# Patient Record
Sex: FEMALE | Race: WHITE | NOT HISPANIC OR LATINO | Employment: OTHER | URBAN - METROPOLITAN AREA
[De-identification: names, ages, dates, MRNs, and addresses within clinical notes are randomized per-mention and may not be internally consistent; named-entity substitution may affect disease eponyms.]

---

## 2018-07-05 ENCOUNTER — OFFICE VISIT - HEALTHEAST (OUTPATIENT)
Dept: ALLERGY | Facility: CLINIC | Age: 68
End: 2018-07-05

## 2018-07-05 DIAGNOSIS — J30.81 ALLERGIC RHINITIS DUE TO DOG HAIR: ICD-10-CM

## 2018-07-05 RX ORDER — LEVOTHYROXINE SODIUM 50 UG/1
TABLET ORAL
Refills: 2 | Status: SHIPPED | COMMUNITY
Start: 2018-06-20

## 2018-07-05 RX ORDER — LORATADINE 10 MG/1
10 TABLET ORAL DAILY
Status: SHIPPED | COMMUNITY
Start: 2018-07-05

## 2018-07-05 RX ORDER — LIOTHYRONINE SODIUM 5 UG/1
TABLET ORAL
Refills: 2 | Status: SHIPPED | COMMUNITY
Start: 2018-06-20

## 2018-07-05 RX ORDER — ALPRAZOLAM 0.25 MG
TABLET ORAL
Refills: 0 | Status: SHIPPED | COMMUNITY
Start: 2018-07-02

## 2018-07-05 RX ORDER — ZOLPIDEM TARTRATE 5 MG/1
TABLET ORAL
Refills: 0 | Status: SHIPPED | COMMUNITY
Start: 2018-07-02

## 2018-07-05 ASSESSMENT — MIFFLIN-ST. JEOR: SCORE: 1273.38

## 2021-05-28 ENCOUNTER — RECORDS - HEALTHEAST (OUTPATIENT)
Dept: ADMINISTRATIVE | Facility: CLINIC | Age: 71
End: 2021-05-28

## 2021-06-01 VITALS — HEIGHT: 63 IN | BODY MASS INDEX: 31.02 KG/M2 | WEIGHT: 175.1 LBS

## 2021-06-19 NOTE — PROGRESS NOTES
"Chief complaint: Concerns with dog allergy    History of present illness: This is a pleasant 67-year-old woman previous patient of Dr. Brewster who is here today for dog allergy evaluation.  She is traveling to her son's house on Sunday in [place].  He has a dog that is 2 years old.  She states when she is around dogs she has some pain in her throat and congestion or ear which develops into a sinus infection.  She states it happens most every time she is exposed to dog.  She reports that her son's house does not have carpeting.  They do have air purifiers.  The dog does not go in the room that she will be staying in.  She does take loratadine as needed.  She brings testing from 2002 that shows delayed testing rather than trigger intradermal testing positive for dog.  She reports that she is around cigarette smoke she also has migraines.  In the past she tried Flonase but it gave her headaches.  She states when she has sinus infection, she is treated with a Z-Delvin.  No history of asthma.  No cough, wheeze or shortness of breath.    Past medical history: Hypothyroidism    Social history: She is retired, non-smoker, no animals in her home    Family history: Negative for allergies and asthma    Review of Systems performed as above and the remainder is negative.      Current Outpatient Prescriptions:      ALPRAZolam (XANAX) 0.25 MG tablet, , Disp: , Rfl: 0     cholecalciferol, vitamin D3, (VITAMIN D3) 2,000 unit capsule, Take 4,000 Units by mouth daily., Disp: , Rfl:      levothyroxine (SYNTHROID, LEVOTHROID) 50 MCG tablet, , Disp: , Rfl: 2     liothyronine (CYTOMEL) 5 MCG tablet, , Disp: , Rfl: 2     zolpidem (AMBIEN) 5 MG tablet, , Disp: , Rfl: 0     loratadine (CLARITIN) 10 mg tablet, Take 10 mg by mouth daily., Disp: , Rfl:     Allergies   Allergen Reactions     Codeine      Levofloxacin        /68  Pulse 68  Ht 5' 3\" (1.6 m)  Wt 175 lb 1.6 oz (79.4 kg)  SpO2 97%  BMI 31.02 kg/m2  Gen: Pleasant female not " in acute distress  HEENT: Eyes no erythema of the bulbar or palpebral conjunctiva, no edema. Mouth: Throat clear, no lip or tongue edema.     Skin: No rashes or lesions  Psych: Alert and oriented times 3    Impression report and plan:  1.  Possible allergic rhinitis to dog    State that delayed testing is not valid.  She is also concerned about being allergic to cigarette smoke.  I stated that this is more of an irritant rather than allergic response.  Avoidance is key with cigarette smoke.  She does seem to have some symptoms around dog, however so would be reasonable for her to institute some environmental control.  Went over environmental control measures for dog.  Recommended nasal rinses.  Okay to double dose loratadine as needed.  Would recommend starting Nasacort nasal spray 1 spray each nostril twice daily.  In the future, I would recommend starting Nasacort nasal spray or montelukast at least one month prior to visiting her son's home if the dog does bother her.  Follow as needed.    Time spent with the patient, 30 minutes, greater than half spent counseling and coordination of care regarding allergies.

## 2021-11-16 ENCOUNTER — ANCILLARY PROCEDURE (OUTPATIENT)
Dept: GENERAL RADIOLOGY | Facility: CLINIC | Age: 71
End: 2021-11-16
Attending: PEDIATRICS
Payer: COMMERCIAL

## 2021-11-16 ENCOUNTER — OFFICE VISIT (OUTPATIENT)
Dept: ORTHOPEDICS | Facility: CLINIC | Age: 71
End: 2021-11-16
Payer: COMMERCIAL

## 2021-11-16 VITALS — DIASTOLIC BLOOD PRESSURE: 80 MMHG | HEIGHT: 63 IN | BODY MASS INDEX: 31.02 KG/M2 | SYSTOLIC BLOOD PRESSURE: 124 MMHG

## 2021-11-16 DIAGNOSIS — M79.671 RIGHT FOOT PAIN: Primary | ICD-10-CM

## 2021-11-16 DIAGNOSIS — M79.671 RIGHT FOOT PAIN: ICD-10-CM

## 2021-11-16 PROCEDURE — 99203 OFFICE O/P NEW LOW 30 MIN: CPT | Performed by: PEDIATRICS

## 2021-11-16 PROCEDURE — 73630 X-RAY EXAM OF FOOT: CPT | Mod: RT | Performed by: RADIOLOGY

## 2021-11-16 RX ORDER — SUMATRIPTAN 50 MG/1
TABLET, FILM COATED ORAL
COMMUNITY

## 2021-11-16 NOTE — Clinical Note
11/16/2021         RE: Carlotta Busch  [address] [facility] [address]        Dear Colleague,    Thank you for referring your patient, Carlotta Busch, to the [facility]. Please see a copy of my visit note below.    ASSESSMENT & PLAN    Carlotta was seen today for pain.    Diagnoses and all orders for this visit:    Right foot pain  -     XR Foot Right G/E 3 Views; Future      Occult bony injury vs bursitis vs capsulitis.  We discussed the following: symptom treatment, activity modification/rest, imaging and support for the affected area. Following discussion, plan:  Support options reviewed, more rigid support. Not certain whether she would like Dynaflex plate or fracture shoe, so she can investigate on own for now. Otherwise, supportive footwear, and activity modification reviewed.  Questions answered. Discussed signs and symptoms that may indicate more serious issues; the patient was instructed to seek appropriate care if noted. Alisiaer indicates understanding of these issues and agrees with the plan.          See Patient Instructions  Patient Instructions   X-rays do not clearly show a fracture or other bony injury in the area of concern in the forefoot.  Occult bony injury is a consideration.  We also discussed potential for some local bursitis, as well as extensor tendinitis.  Icing, over-the-counter medication as needed for soreness.  Discussed limiting activities for soreness as well.  We also discussed use of more rigid foot support for comfort with activities, including Dynaflex plate versus a fracture shoe.  They were with activities if desired, not required.  Plan to monitor over the next 1 to 2 weeks.  If not getting good improvement during that time, contact the clinic, and we would consider an MRI for further investigation.    If you have any further questions for your physician or physician s care team you can call [phone] and use option 3 to leave a  "voice message. Calls received during business hours will be returned same day.        [facility]    -----  Chief Complaint   Patient presents with     Right Foot - Pain       SUBJECTIVE  Carlotta Busch is a/an 71 year old female who is seen as a self referral for evaluation of right foot pain.  Had her foot caught in her vacuum cord about 5 months ago.  Inverted her foot.  Has had intermittent pain since that time.  Pain and swelling increased over the past 1-2 days following a walk.  Pain is midfoot.  .     The patient is seen by themselves.  The patient is Right handed    Onset: 5 month(s) ago. Patient describes injury as possible inversion of foot   Location of Pain: right mid foot 3rd and 4th metatarsals   Worsened by: walking   Better with: ice   Treatments tried: ice  Associated symptoms: swelling    Orthopedic/Surgical history: HX of foot fx, not sure what side, HX of small toe fx last year, was not seen for it.   Social History/Occupation: retired     No family history pertinent to patient's problem today.     **  Foot pain increased past couple days.  Previous right 5th toe fracture.  Pain in foot is more along 4th MT course. Also distal 3rd MT, more in distal 4th MT, to base of toes.  Had some initial swelling and bruising in right foot after injury, then improved. Now swelling has returned a bit.  No N/T.      REVIEW OF SYSTEMS:  Review of Systems      OBJECTIVE:  /80   Ht 1.6 m (5' 3\")   BMI 31.02 kg/m     General: healthy, alert and in no distress  HEENT: no scleral icterus or conjunctival erythema  Skin: no suspicious lesions or rash. No jaundice.  CV: distal perfusion intact   Resp: normal respiratory effort without conversational dyspnea   Psych: normal mood and affect  Gait: normal steady gait with appropriate coordination and balance, some pain with toeing off  Neuro: Normal light sensory exam of  extremity       Right Foot " exam    ROM:      Ankle grossly full  Toes mild limited with pain  Passive lesser toe motion also with some forefoot pain    Strength: intact ankle, toes, mild forefoot pain with assessing toes     Tender:     Dorsal distal forefoot, mid-distal 3rd and 4th MT, MTP joints   Also plantar forefoot tender same area    Inspection: hallux valgus    Skin:     neg (-) bruising        positive (+) swelling mild dorsal forefoot       well perfused       capillary refill brisk          RADIOLOGY:  I independently ordered, visualized and reviewed these images with the patient  No acute bony abnormality, particularly in lateral midfoot    Recent Results (from the past 24 hour(s))   XR Foot Right G/E 3 Views    Narrative    XR FOOT RIGHT G/E 3 VIEWS 11/16/2021 10:57 AM     HISTORY: Right foot pain      Impression    IMPRESSION: Mild bunion. Otherwise unremarkable radiographs.    YANETH ANGULO MD         SYSTEM ID:  [id number]             Again, thank you for allowing me to participate in the care of your patient.        Sincerely,        Esequiel Rees DO

## 2021-11-16 NOTE — PROGRESS NOTES
ASSESSMENT & PLAN    Carlotta was seen today for pain.    Diagnoses and all orders for this visit:    Right foot pain  -     XR Foot Right G/E 3 Views; Future      Occult bony injury vs bursitis vs capsulitis.  We discussed the following: symptom treatment, activity modification/rest, imaging and support for the affected area. Following discussion, plan:  Support options reviewed, more rigid support. Not certain whether she would like Dynaflex plate or fracture shoe, so she can investigate on own for now. Otherwise, supportive footwear, and activity modification reviewed.  Questions answered. Discussed signs and symptoms that may indicate more serious issues; the patient was instructed to seek appropriate care if noted. Ginger indicates understanding of these issues and agrees with the plan.          See Patient Instructions  Patient Instructions   X-rays do not clearly show a fracture or other bony injury in the area of concern in the forefoot.  Occult bony injury is a consideration.  We also discussed potential for some local bursitis, as well as extensor tendinitis.  Icing, over-the-counter medication as needed for soreness.  Discussed limiting activities for soreness as well.  We also discussed use of more rigid foot support for comfort with activities, including Dynaflex plate versus a fracture shoe.  They were with activities if desired, not required.  Plan to monitor over the next 1 to 2 weeks.  If not getting good improvement during that time, contact the clinic, and we would consider an MRI for further investigation.    If you have any further questions for your physician or physician s care team you can call [phone] and use option 3 to leave a voice message. Calls received during business hours will be returned same day.        Esequiel Rees DO  [facility]    -----  Chief Complaint   Patient presents with     Right Foot - Pain       SUBJECTIVE  Carlotta TA  "Jo-Ann is a/an 71 year old female who is seen as a self referral for evaluation of right foot pain.  Had her foot caught in her vacuum cord about 5 months ago.  Inverted her foot.  Has had intermittent pain since that time.  Pain and swelling increased over the past 1-2 days following a walk.  Pain is midfoot.  .     The patient is seen by themselves.  The patient is Right handed    Onset: 5 month(s) ago. Patient describes injury as possible inversion of foot   Location of Pain: right mid foot 3rd and 4th metatarsals   Worsened by: walking   Better with: ice   Treatments tried: ice  Associated symptoms: swelling    Orthopedic/Surgical history: HX of foot fx, not sure what side, HX of small toe fx last year, was not seen for it.   Social History/Occupation: retired     No family history pertinent to patient's problem today.     **  Foot pain increased past couple days.  Previous right 5th toe fracture.  Pain in foot is more along 4th MT course. Also distal 3rd MT, more in distal 4th MT, to base of toes.  Had some initial swelling and bruising in right foot after injury, then improved. Now swelling has returned a bit.  No N/T.      REVIEW OF SYSTEMS:  Review of Systems      OBJECTIVE:  /80   Ht 1.6 m (5' 3\")   BMI 31.02 kg/m     General: healthy, alert and in no distress  HEENT: no scleral icterus or conjunctival erythema  Skin: no suspicious lesions or rash. No jaundice.  CV: distal perfusion intact   Resp: normal respiratory effort without conversational dyspnea   Psych: normal mood and affect  Gait: normal steady gait with appropriate coordination and balance, some pain with toeing off  Neuro: Normal light sensory exam of  extremity       Right Foot exam    ROM:      Ankle grossly full  Toes mild limited with pain  Passive lesser toe motion also with some forefoot pain    Strength: intact ankle, toes, mild forefoot pain with assessing toes     Tender:     Dorsal distal forefoot, mid-distal 3rd and 4th MT, " MTP joints   Also plantar forefoot tender same area    Inspection: hallux valgus    Skin:     neg (-) bruising        positive (+) swelling mild dorsal forefoot       well perfused       capillary refill brisk          RADIOLOGY:  I independently ordered, visualized and reviewed these images with the patient  No acute bony abnormality, particularly in lateral midfoot    Recent Results (from the past 24 hour(s))   XR Foot Right G/E 3 Views    Narrative    XR FOOT RIGHT G/E 3 VIEWS 11/16/2021 10:57 AM     HISTORY: Right foot pain      Impression    IMPRESSION: Mild bunion. Otherwise unremarkable radiographs.    YANETH ANGULO MD         SYSTEM ID:  [id number]

## 2021-11-16 NOTE — PATIENT INSTRUCTIONS
X-rays do not clearly show a fracture or other bony injury in the area of concern in the forefoot.  Occult bony injury is a consideration.  We also discussed potential for some local bursitis, as well as extensor tendinitis.  Icing, over-the-counter medication as needed for soreness.  Discussed limiting activities for soreness as well.  We also discussed use of more rigid foot support for comfort with activities, including Dynaflex plate versus a fracture shoe.  They were with activities if desired, not required.  Plan to monitor over the next 1 to 2 weeks.  If not getting good improvement during that time, contact the clinic, and we would consider an MRI for further investigation.    If you have any further questions for your physician or physician s care team you can call [phone] and use option 3 to leave a voice message. Calls received during business hours will be returned same day.

## 2021-11-22 ENCOUNTER — TELEPHONE (OUTPATIENT)
Dept: ORTHOPEDICS | Facility: CLINIC | Age: 71
End: 2021-11-22
Payer: COMMERCIAL

## 2021-11-22 DIAGNOSIS — M79.671 RIGHT FOOT PAIN: Primary | ICD-10-CM

## 2021-11-22 NOTE — TELEPHONE ENCOUNTER
[facility] Call Center    Phone Message    May a detailed message be left on voicemail: yes     Reason for Call: Other: Prior Auth need for right foot MRI. Patient is also wondering how the MRI is done, is it through a tube? Please call patient back.     Action Taken: Message routed to:  Other: Sports Medicine    Travel Screening: Not Applicable

## 2021-11-22 NOTE — TELEPHONE ENCOUNTER
MRI has not been ordered.     Called and spoke with patient. Continues to have swelling and pain in her foot.  Pain third and forth toe into her foot.   She would like an MRI for further evaluation.     MRI order pended.  Please advise    Keisha Gore MS ATC

## 2021-11-23 ENCOUNTER — HOSPITAL ENCOUNTER (OUTPATIENT)
Dept: MRI IMAGING | Facility: HOSPITAL | Age: 71
Discharge: HOME | End: 2021-11-23
Attending: PEDIATRICS | Admitting: PEDIATRICS
Payer: COMMERCIAL

## 2021-11-23 DIAGNOSIS — M79.671 RIGHT FOOT PAIN: ICD-10-CM

## 2021-11-23 PROCEDURE — 73718 MRI LOWER EXTREMITY W/O DYE: CPT | Mod: RT

## 2021-11-23 NOTE — TELEPHONE ENCOUNTER
LVM for patient that MRI order was signed.  Left number for imaging scheduling.   Keisha Gore MS ATC

## 2021-11-24 ENCOUNTER — TELEPHONE (OUTPATIENT)
Dept: ORTHOPEDICS | Facility: CLINIC | Age: 71
End: 2021-11-24
Payer: COMMERCIAL

## 2021-11-24 DIAGNOSIS — M79.671 RIGHT FOOT PAIN: Primary | ICD-10-CM

## 2021-11-25 NOTE — TELEPHONE ENCOUNTER
Reason for Call:  Request for results:    Name of test or procedure: MRI of right foot    Date of test of procedure: 11/23/21    Results:   Results for orders placed or performed during the hospital encounter of 11/23/21   MR Foot Right w/o Contrast    Narrative    EXAM: MR FOOT RIGHT W/O CONTRAST  LOCATION: [facility]  DATE/TIME: 11/23/2021 7:02 PM    INDICATION:  Right foot pain, prior injury.  COMPARISON: 11/16/2021 radiographs.  TECHNIQUE: Unenhanced.    FINDINGS:     JOINTS AND BONES:   -Acute nondisplaced transverse fracture of the distal portion of the third metatarsal shaft with moderate surrounding marrow and soft tissue edema, as best seen series 7, image 16 and series 4, image 13. No additional fractures. No contusion. Mild   degenerative changes at the first MTP joint. No joint effusion.    TENDONS:   -No tendon tear, tendinopathy, or tenosynovitis.     LIGAMENTS:   -Lisfranc ligament: Intact. No subluxation.    MUSCLES AND SOFT TISSUES:   -No muscle atrophy or edema. No soft tissue mass or fluid collection.      Impression    IMPRESSION:  1.  Acute nondisplaced fracture of the distal portion of the third metatarsal shaft.    2.  No evidence of additional fractures.       Plan for results from last OV: call patient    OK to leave the result message on voice mail or with a family member? YES    Phone number Patient can be reached at:  Home number on file [phone] (home)    Jack Finley ATC

## 2021-11-26 NOTE — TELEPHONE ENCOUNTER
There is edema in the forefoot, and findings are consistent with a fracture in the 3rd metatarsal. Also mild degenerative change at base of great toe (first MTP joint). Remainder without concern.  This fracture is an interesting finding given the injury to her foot was rather remote (~5 months ago).  Would advise rigid support for the foot. We had discussed fracture shoe, Dynaflex plate at her visit. Another option is short cam walker.  If she is not currently using something for support, advise she do so whenever she is up and on her feet (exceptions may be bathing, changing). Would then recheck in approximately 2 weeks, with repeat x-ray.  I would be happy to have a visit with the patient (in person, by video, or by phone) to discuss further if that would be helpful.  Thanks.  Esequiel Rees DO, CAMITALI

## 2021-11-26 NOTE — TELEPHONE ENCOUNTER
Phone call to patient, informed her of results below, she would like to come  a short walking boot today from [facility].     Informed patient she can come anytime to pick this up before 5pm    Destinee Ferrer ATC

## 2021-11-26 NOTE — TELEPHONE ENCOUNTER
Patient came in to  boot, showed patient how to wear and put on boot, instructed her to follow-up in 2 weeks for repeat XR    Destinee Ferrer, ATC

## 2021-12-15 ENCOUNTER — ANCILLARY PROCEDURE (OUTPATIENT)
Dept: GENERAL RADIOLOGY | Facility: CLINIC | Age: 71
End: 2021-12-15
Attending: PEDIATRICS
Payer: COMMERCIAL

## 2021-12-15 ENCOUNTER — OFFICE VISIT (OUTPATIENT)
Dept: ORTHOPEDICS | Facility: CLINIC | Age: 71
End: 2021-12-15
Payer: COMMERCIAL

## 2021-12-15 VITALS — DIASTOLIC BLOOD PRESSURE: 80 MMHG | SYSTOLIC BLOOD PRESSURE: 122 MMHG

## 2021-12-15 DIAGNOSIS — M79.671 RIGHT FOOT PAIN: ICD-10-CM

## 2021-12-15 DIAGNOSIS — S92.309A METATARSAL FRACTURE: ICD-10-CM

## 2021-12-15 DIAGNOSIS — S92.334D CLOSED NONDISPLACED FRACTURE OF THIRD METATARSAL BONE OF RIGHT FOOT WITH ROUTINE HEALING, SUBSEQUENT ENCOUNTER: Primary | ICD-10-CM

## 2021-12-15 PROCEDURE — 73630 X-RAY EXAM OF FOOT: CPT | Mod: RT | Performed by: RADIOLOGY

## 2021-12-15 PROCEDURE — 99213 OFFICE O/P EST LOW 20 MIN: CPT | Performed by: PEDIATRICS

## 2021-12-15 NOTE — PROGRESS NOTES
ASSESSMENT & PLAN    Carlotta was seen today for recheck.    Diagnoses and all orders for this visit:    Closed nondisplaced fracture of third metatarsal bone of right foot with routine healing, subsequent encounter  -     XR Foot Right G/E 3 Views; Future    Right foot pain  -     XR Foot Right G/E 3 Views; Future      Timeline from last x-ray to current would indicate this was likely rather acute prior to last visit, and is now healing.  Discussed use of foot support, may wean if comfortable. Discussed gradual return to activities, provided pain free.  Offered PT, she may contact clinic if interested.  Monitor additional 2 weeks to start, and if improving well, then may be able to follow-up prn.  Questions answered. Discussed signs and symptoms that may indicate more serious issues; the patient was instructed to seek appropriate care if noted. Hannah indicates understanding of these issues and agrees with the plan.        See Patient Instructions  Patient Instructions   X-rays from today show good healing at the third metatarsal fracture site.  I suspect that the injury with the vacuum  over the summer set the stage for some bony stress in this area, and the walk that preceded the onset of more significant pain is what really caused the fracture.  It is good to see the amount of healing on the x-rays.  May continue with a cam walker for support as desired, but may also start to wean when comfortable.  We discussed weaning into supportive footwear, such as an athletic shoe.  Rigid shoe insert such as a Dynaflex plate may be a consideration when weaning the boot.  Contact clinic if interested in this.  Also discussed potential for physical therapy, depending on course with weaning the boot.  Plan to monitor over the next 1 to 2 weeks to start.  Contact clinic if any questions or concerns, or if any challenges in weaning the Cam walker.    If you have any further questions for your physician or physician s care  team you can call [phone] and use option 3 to leave a voice message. Calls received during business hours will be returned same day.        Esequiel Rees [facility]    SUBJECTIVE- Interim History December 15, 2021    Chief Complaint   Patient presents with     Right Foot - RECHECK       Carlotta Busch is a 71 year old female who is seen in f/u up for    Right foot pain  Metatarsal fracture. Since last visit on 11/16/21 patient has undergone an MRI and has been using the CAM boot. Does feel that there has been improvement in her foot pain.  .  - Now ~ 6 months from initial injury, in CAM boot for 2.5 weeks    **  Morning is ok. At night gets more of a bluish color. Gets some swelling at times.  Overall improving with use of cam walker.    REVIEW OF SYSTEMS:  Review of Systems      OBJECTIVE:  /80      GENERAL APPEARANCE: healthy, alert and no distress   GAIT: cam walker  SKIN: no suspicious lesions or rashes  HEENT: Sclera clear, anicteric  CV: no lower extremity edema, good peripheral pulses  RESP: Breathing not labored  NEURO: Normal strength and tone, mentation intact and speech normal  PSYCH:  mentation appears normal and affect normal/bright      Right foot:  Mild dorsal foot soft tissue swelling.  Mild limitation toe motion, stiffness.  Skin with slightly dusky appearance overlying forefoot, no significant ecchymosis noted. No erythema.      RADIOLOGY:  Final results and radiologist's interpretation, available in the [facility] health record.  Images were reviewed with the patient in the office today.  My personal interpretation of the performed imaging: healing nondisplaced 3rd MT fracture.    XR Foot Right G/E 3 Views    Narrative    FOOT RIGHT THREE OR MORE VIEWS   12/15/2021 10:36 AM     HISTORY: Right foot pain; Metatarsal fracture.    COMPARISON: 11/16/2021 x-ray.      Impression    IMPRESSION: Healing nondisplaced fracture distal third metatarsal  near  the metatarsal neck.    WESLEY ALEXANDER MD         SYSTEM ID:  [id number]         Reviewed MRI as well:    Results for orders placed or performed during the hospital encounter of 11/23/21   MR Foot Right w/o Contrast    Narrative    EXAM: MR FOOT RIGHT W/O CONTRAST  LOCATION: [facility]  DATE/TIME: 11/23/2021 7:02 PM    INDICATION:  Right foot pain, prior injury.  COMPARISON: 11/16/2021 radiographs.  TECHNIQUE: Unenhanced.    FINDINGS:     JOINTS AND BONES:   -Acute nondisplaced transverse fracture of the distal portion of the third metatarsal shaft with moderate surrounding marrow and soft tissue edema, as best seen series 7, image 16 and series 4, image 13. No additional fractures. No contusion. Mild   degenerative changes at the first MTP joint. No joint effusion.    TENDONS:   -No tendon tear, tendinopathy, or tenosynovitis.     LIGAMENTS:   -Lisfranc ligament: Intact. No subluxation.    MUSCLES AND SOFT TISSUES:   -No muscle atrophy or edema. No soft tissue mass or fluid collection.      Impression    IMPRESSION:  1.  Acute nondisplaced fracture of the distal portion of the third metatarsal shaft.    2.  No evidence of additional fractures.

## 2021-12-15 NOTE — PATIENT INSTRUCTIONS
X-rays from today show good healing at the third metatarsal fracture site.  I suspect that the injury with the vacuum  over the summer set the stage for some bony stress in this area, and the walk that preceded the onset of more significant pain is what really caused the fracture.  It is good to see the amount of healing on the x-rays.  May continue with a cam walker for support as desired, but may also start to wean when comfortable.  We discussed weaning into supportive footwear, such as an athletic shoe.  Rigid shoe insert such as a Dynaflex plate may be a consideration when weaning the boot.  Contact clinic if interested in this.  Also discussed potential for physical therapy, depending on course with weaning the boot.  Plan to monitor over the next 1 to 2 weeks to start.  Contact clinic if any questions or concerns, or if any challenges in weaning the Cam walker.    If you have any further questions for your physician or physician s care team you can call [phone] and use option 3 to leave a voice message. Calls received during business hours will be returned same day.

## 2021-12-15 NOTE — LETTER
12/15/2021         RE: Carlotta Busch  [address] [facility] [address]        Dear Colleague,    Thank you for referring your patient, Carlotta Busch, to the [facility]. Please see a copy of my visit note below.    ASSESSMENT & PLAN    Carlotta was seen today for recheck.    Diagnoses and all orders for this visit:    Closed nondisplaced fracture of third metatarsal bone of right foot with routine healing, subsequent encounter  -     XR Foot Right G/E 3 Views; Future    Right foot pain  -     XR Foot Right G/E 3 Views; Future      Timeline from last x-ray to current would indicate this was likely rather acute prior to last visit, and is now healing.  Discussed use of foot support, may wean if comfortable. Discussed gradual return to activities, provided pain free.  Offered PT, she may contact clinic if interested.  Monitor additional 2 weeks to start, and if improving well, then may be able to follow-up prn.  Questions answered. Discussed signs and symptoms that may indicate more serious issues; the patient was instructed to seek appropriate care if noted. Hannah indicates understanding of these issues and agrees with the plan.        See Patient Instructions  Patient Instructions   X-rays from today show good healing at the third metatarsal fracture site.  I suspect that the injury with the vacuum  over the summer set the stage for some bony stress in this area, and the walk that preceded the onset of more significant pain is what really caused the fracture.  It is good to see the amount of healing on the x-rays.  May continue with a cam walker for support as desired, but may also start to wean when comfortable.  We discussed weaning into supportive footwear, such as an athletic shoe.  Rigid shoe insert such as a Dynaflex plate may be a consideration when weaning the boot.  Contact clinic if interested in this.  Also discussed potential for physical therapy,  depending on course with weaning the boot.  Plan to monitor over the next 1 to 2 weeks to start.  Contact clinic if any questions or concerns, or if any challenges in weaning the Cam walker.    If you have any further questions for your physician or physician s care team you can call [phone] and use option 3 to leave a voice message. Calls received during business hours will be returned same day.        [facility]    SUBJECTIVE- Interim History December 15, 2021    Chief Complaint   Patient presents with     Right Foot - RECHECK       Carlotta Busch is a 71 year old female who is seen in f/u up for    Right foot pain  Metatarsal fracture. Since last visit on 11/16/21 patient has undergone an MRI and has been using the CAM boot. Does feel that there has been improvement in her foot pain.  .  - Now ~ 6 months from initial injury, in CAM boot for 2.5 weeks    **  Morning is ok. At night gets more of a bluish color. Gets some swelling at times.  Overall improving with use of cam walker.    REVIEW OF SYSTEMS:  Review of Systems      OBJECTIVE:  /80      GENERAL APPEARANCE: healthy, alert and no distress   GAIT: cam walker  SKIN: no suspicious lesions or rashes  HEENT: Sclera clear, anicteric  CV: no lower extremity edema, good peripheral pulses  RESP: Breathing not labored  NEURO: Normal strength and tone, mentation intact and speech normal  PSYCH:  mentation appears normal and affect normal/bright      Right foot:  Mild dorsal foot soft tissue swelling.  Mild limitation toe motion, stiffness.  Skin with slightly dusky appearance overlying forefoot, no significant ecchymosis noted. No erythema.      RADIOLOGY:  Final results and radiologist's interpretation, available in the [facility] health record.  Images were reviewed with the patient in the office today.  My personal interpretation of the performed imaging: healing nondisplaced 3rd MT  fracture.    XR Foot Right G/E 3 Views    Narrative    FOOT RIGHT THREE OR MORE VIEWS   12/15/2021 10:36 AM     HISTORY: Right foot pain; Metatarsal fracture.    COMPARISON: 11/16/2021 x-ray.      Impression    IMPRESSION: Healing nondisplaced fracture distal third metatarsal near  the metatarsal neck.    WESLEY ALEXANDER MD         SYSTEM ID:  [id number]         Reviewed MRI as well:    Results for orders placed or performed during the hospital encounter of 11/23/21   MR Foot Right w/o Contrast    Narrative    EXAM: MR FOOT RIGHT W/O CONTRAST  LOCATION: [facility]  DATE/TIME: 11/23/2021 7:02 PM    INDICATION:  Right foot pain, prior injury.  COMPARISON: 11/16/2021 radiographs.  TECHNIQUE: Unenhanced.    FINDINGS:     JOINTS AND BONES:   -Acute nondisplaced transverse fracture of the distal portion of the third metatarsal shaft with moderate surrounding marrow and soft tissue edema, as best seen series 7, image 16 and series 4, image 13. No additional fractures. No contusion. Mild   degenerative changes at the first MTP joint. No joint effusion.    TENDONS:   -No tendon tear, tendinopathy, or tenosynovitis.     LIGAMENTS:   -Lisfranc ligament: Intact. No subluxation.    MUSCLES AND SOFT TISSUES:   -No muscle atrophy or edema. No soft tissue mass or fluid collection.      Impression    IMPRESSION:  1.  Acute nondisplaced fracture of the distal portion of the third metatarsal shaft.    2.  No evidence of additional fractures.             Again, thank you for allowing me to participate in the care of your patient.        Sincerely,        Esequiel Rees DO

## 2023-03-20 ENCOUNTER — TRANSFERRED RECORDS (OUTPATIENT)
Dept: HEALTH INFORMATION MANAGEMENT | Facility: CLINIC | Age: 73
End: 2023-03-20
Payer: COMMERCIAL

## 2023-03-20 ENCOUNTER — MEDICAL CORRESPONDENCE (OUTPATIENT)
Dept: HEALTH INFORMATION MANAGEMENT | Facility: CLINIC | Age: 73
End: 2023-03-20
Payer: COMMERCIAL

## 2023-03-22 ENCOUNTER — OFFICE VISIT (OUTPATIENT)
Dept: CARDIOLOGY | Facility: CLINIC | Age: 73
End: 2023-03-22
Payer: COMMERCIAL

## 2023-03-22 VITALS
WEIGHT: 166 LBS | DIASTOLIC BLOOD PRESSURE: 70 MMHG | SYSTOLIC BLOOD PRESSURE: 122 MMHG | HEART RATE: 70 BPM | HEIGHT: 63 IN | RESPIRATION RATE: 16 BRPM | BODY MASS INDEX: 29.41 KG/M2

## 2023-03-22 DIAGNOSIS — R94.31 ABNORMAL ELECTROCARDIOGRAM: ICD-10-CM

## 2023-03-22 PROCEDURE — 99204 OFFICE O/P NEW MOD 45 MIN: CPT | Performed by: INTERNAL MEDICINE

## 2023-03-22 NOTE — LETTER
3/22/2023    [facility] [address]  [facility] [address]    RE: Carlotta Busch       Dear Colleague,     I had the pleasure of seeing Carlotta Busch in the [facility].    HEART CARE ENCOUNTER NOTE      [facility]  [phone]      Assessment/Recommendations   Assessment:   1.Abnormal ECG probably reflects lead placement issue rather than loss of anterior force/previous MI.  Leads V1 and V2 look the same with a Q wave. Lead V3 has a tall R wave consistent with normal anterior forces.  She has no clinical history of any myocardial event.    Plan:   1.  We will get an echocardiogram to look at LV structure and function.  If this looks normal it effectively rules out previous anteroseptal MI and I do not think any myocardial perfusion imaging or noninvasive angiography is necessary.  She could proceed with her planned knee surgery without further cardiac evaluation.           History of Present Illness/Subjective    HPI: Carlotta Busch is a 72 year old female without prior cardiac history.  In the last month [place] [employer] and suddenly developed sharp knee pain.  Further evaluation disclosed bone-on-bone pathology and is been recommended that she have knee replacement surgery.    Preop evaluation electrocardiogram was obtained which suggested prior anteroseptal MI.  She has no clinical history of chest pain or previous MI.  Her only risk factor is a positive family history in her father who is overweight and smoked excessively.  She has no personal history of diabetes hypertension hyperlipidemia and has never been a smoker.    Studies reviewed:  ECG: From 3/20/2023 shows a sinus rhythm, septal Q's in V1 and V2 with tall R wave in V3.  No ischemic ST segment changes.  Echo:pending         Physical Examination  Review of Systems   /70 (BP Location: Left arm, Patient Position: Sitting, Cuff Size: Adult  "Regular)   Pulse 70   Resp 16   Ht 1.6 m (5' 3\")   Wt 75.3 kg (166 lb)   BMI 29.41 kg/m    Body mass index is 29.41 kg/m .  Wt Readings from Last 3 Encounters:   03/22/23 75.3 kg (166 lb)   07/05/18 79.4 kg (175 lb 1.6 oz)       General Appearance:   Pleasant female appears stated age. no acute distress, normal body habitus   ENT/Mouth: Oropharynx normal    EYES:  no scleral icterus, normal conjunctivae. No eyelid xanthelasma   Neck: No cervical lymphadenopathy  Thyroid not enlarged or nodular  Okay  Probably need either next eventually from the [place] JVD   Respiratory:   lungs clear , no rales or wheezing, Normal chest wall expansion    Cardiovascular:   Regular rhythm, normal rate. Normal 1st and 2nd heart sounds.  No murmurs, no rubs or gallops;   radial pulses are full and equal   Jugular venous pressure is nonelevated   Abdomen/GI:  No tenderness, no organomegaly, no pulsatile masses. NBS.   Extremities: No cyanosis or clubbing.  No peripheral edema   Skin: No rash or lesions   Heme/lymph/ Immunology No lymphadenopathy, petechiae   Neurologic: Alert and oriented. No focal motor weakness, gait appears normal.       Psychiatric: Pleasant, calm, appropriate affect.          No known hepatic, renal, pulmonary, or CNS disorders. The remainder of his complete ROS is negative except as noted above.         Medical History  Surgical History Family History Social History   Past Medical History:   Diagnosis Date     Osteoporosis      Thyroid disease      No past surgical history on file.  No family history on file.     Social History     Socioeconomic History     Marital status:      Spouse name: Not on file     Number of children: Not on file     Years of education: Not on file     Highest education level: Not on file   Occupational History     Not on file   Tobacco Use     Smoking status: Never     Smokeless tobacco: Never   Substance and Sexual Activity     Alcohol use: Not on file     Drug use: Not " on file     Sexual activity: Not on file   Other Topics Concern     Not on file   Social History Narrative     Not on file     Social Determinants of Health     Financial Resource Strain: Not on file   Food Insecurity: Not on file   Transportation Needs: Not on file   Physical Activity: Not on file   Stress: Not on file   Social Connections: Not on file   Intimate Partner Violence: Not on file   Housing Stability: Not on file           Medications  Allergies   Current Outpatient Medications   Medication Sig Dispense Refill     ALPRAZolam (XANAX) 0.25 MG tablet [ALPRAZOLAM (XANAX) 0.25 MG TABLET]   0     cholecalciferol, vitamin D3, (VITAMIN D3) 2,000 unit capsule [CHOLECALCIFEROL, VITAMIN D3, (VITAMIN D3) 2,000 UNIT CAPSULE] Take 4,000 Units by mouth daily.       levothyroxine (SYNTHROID, LEVOTHROID) 50 MCG tablet [LEVOTHYROXINE (SYNTHROID, LEVOTHROID) 50 MCG TABLET]   2     liothyronine (CYTOMEL) 5 MCG tablet [LIOTHYRONINE (CYTOMEL) 5 MCG TABLET]   2     loratadine (CLARITIN) 10 mg tablet [LORATADINE (CLARITIN) 10 MG TABLET] Take 10 mg by mouth daily.       sertraline (ZOLOFT) 50 MG tablet Take 1 tablet by mouth daily       SUMAtriptan (IMITREX) 50 MG tablet 2 tablet as needed one time       zolpidem (AMBIEN) 5 MG tablet [ZOLPIDEM (AMBIEN) 5 MG TABLET]   0       Allergies   Allergen Reactions     Codeine Unknown     Levofloxacin Unknown          Lab Results    Chemistry/lipid CBC Cardiac Enzymes/BNP/TSH/INR   No results for input(s): CHOL, HDL, LDL, TRIG, CHOLHDLRATIO in the last 98206 hours.  No results for input(s): LDL in the last 88576 hours.  No results for input(s): NA, POTASSIUM, CHLORIDE, CO2, GLC, BUN, CR, GFRESTIMATED, ANA in the last 59895 hours.    Invalid input(s): GRFESTBLACK  No results for input(s): CR in the last 08436 hours.  No results for input(s): A1C in the last 14377 hours.       No results for input(s): WBC, HGB, HCT, MCV, PLT in the last 07561 hours.  No results for input(s): HGB in the  last 05409 hours. No results for input(s): TROPONINI in the last 18203 hours.  No results for input(s): BNP, NTBNPI, NTBNP in the last 45000 hours.  No results for input(s): TSH in the last 24167 hours.  No results for input(s): INR in the last 74420 hours.     Renato Nolasco MD    Thank you for allowing me to participate in the care of your patient.      Sincerely,     Renato Nolasco MD     [facility]  cc:   Renato Nolasco MD  [address]  [place] [address]

## 2023-03-22 NOTE — PROGRESS NOTES
"  HEART CARE ENCOUNTER NOTE      [facility]  [phone]      Assessment/Recommendations   Assessment:   1.Abnormal ECG probably reflects lead placement issue rather than loss of anterior force/previous MI.  Leads V1 and V2 look the same with a Q wave. Lead V3 has a tall R wave consistent with normal anterior forces.  She has no clinical history of any myocardial event.    Plan:   1.  We will get an echocardiogram to look at LV structure and function.  If this looks normal it effectively rules out previous anteroseptal MI and I do not think any myocardial perfusion imaging or noninvasive angiography is necessary.  She could proceed with her planned knee surgery without further cardiac evaluation.           History of Present Illness/Subjective    HPI: Carlotta Busch is a 72 year old female without prior cardiac history.  In the last month she was walking in the [place] and suddenly developed sharp knee pain.  Further evaluation disclosed bone-on-bone pathology and is been recommended that she have knee replacement surgery.    Preop evaluation electrocardiogram was obtained which suggested prior anteroseptal MI.  She has no clinical history of chest pain or previous MI.  Her only risk factor is a positive family history in her father who is overweight and smoked excessively.  She has no personal history of diabetes hypertension hyperlipidemia and has never been a smoker.    Studies reviewed:  ECG: From 3/20/2023 shows a sinus rhythm, septal Q's in V1 and V2 with tall R wave in V3.  No ischemic ST segment changes.  Echo:pending         Physical Examination  Review of Systems   /70 (BP Location: Left arm, Patient Position: Sitting, Cuff Size: Adult Regular)   Pulse 70   Resp 16   Ht 1.6 m (5' 3\")   Wt 75.3 kg (166 lb)   BMI 29.41 kg/m    Body mass index is 29.41 kg/m .  Wt Readings from Last 3 Encounters:   03/22/23 75.3 kg (166 lb)   07/05/18 79.4 kg (175 lb 1.6 oz) "       General Appearance:   Pleasant female appears stated age. no acute distress, normal body habitus   ENT/Mouth: Oropharynx normal    EYES:  no scleral icterus, normal conjunctivae. No eyelid xanthelasma   Neck: No cervical lymphadenopathy  Thyroid not enlarged or nodular  Okay  Probably need either next eventually from the [place] JVD   Respiratory:   lungs clear , no rales or wheezing, Normal chest wall expansion    Cardiovascular:   Regular rhythm, normal rate. Normal 1st and 2nd heart sounds.  No murmurs, no rubs or gallops;   radial pulses are full and equal   Jugular venous pressure is nonelevated   Abdomen/GI:  No tenderness, no organomegaly, no pulsatile masses. NBS.   Extremities: No cyanosis or clubbing.  No peripheral edema   Skin: No rash or lesions   Heme/lymph/ Immunology No lymphadenopathy, petechiae   Neurologic: Alert and oriented. No focal motor weakness, gait appears normal.       Psychiatric: Pleasant, calm, appropriate affect.          No known hepatic, renal, pulmonary, or CNS disorders. The remainder of his complete ROS is negative except as noted above.         Medical History  Surgical History Family History Social History   Past Medical History:   Diagnosis Date     Osteoporosis      Thyroid disease      No past surgical history on file.  No family history on file.     Social History     Socioeconomic History     Marital status:      Spouse name: Not on file     Number of children: Not on file     Years of education: Not on file     Highest education level: Not on file   Occupational History     Not on file   Tobacco Use     Smoking status: Never     Smokeless tobacco: Never   Substance and Sexual Activity     Alcohol use: Not on file     Drug use: Not on file     Sexual activity: Not on file   Other Topics Concern     Not on file   Social History Narrative     Not on file     Social Determinants of Health     Financial Resource Strain: Not on file   Food Insecurity: Not on  file   Transportation Needs: Not on file   Physical Activity: Not on file   Stress: Not on file   Social Connections: Not on file   Intimate Partner Violence: Not on file   Housing Stability: Not on file           Medications  Allergies   Current Outpatient Medications   Medication Sig Dispense Refill     ALPRAZolam (XANAX) 0.25 MG tablet [ALPRAZOLAM (XANAX) 0.25 MG TABLET]   0     cholecalciferol, vitamin D3, (VITAMIN D3) 2,000 unit capsule [CHOLECALCIFEROL, VITAMIN D3, (VITAMIN D3) 2,000 UNIT CAPSULE] Take 4,000 Units by mouth daily.       levothyroxine (SYNTHROID, LEVOTHROID) 50 MCG tablet [LEVOTHYROXINE (SYNTHROID, LEVOTHROID) 50 MCG TABLET]   2     liothyronine (CYTOMEL) 5 MCG tablet [LIOTHYRONINE (CYTOMEL) 5 MCG TABLET]   2     loratadine (CLARITIN) 10 mg tablet [LORATADINE (CLARITIN) 10 MG TABLET] Take 10 mg by mouth daily.       sertraline (ZOLOFT) 50 MG tablet Take 1 tablet by mouth daily       SUMAtriptan (IMITREX) 50 MG tablet 2 tablet as needed one time       zolpidem (AMBIEN) 5 MG tablet [ZOLPIDEM (AMBIEN) 5 MG TABLET]   0       Allergies   Allergen Reactions     Codeine Unknown     Levofloxacin Unknown          Lab Results    Chemistry/lipid CBC Cardiac Enzymes/BNP/TSH/INR   No results for input(s): CHOL, HDL, LDL, TRIG, CHOLHDLRATIO in the last 92058 hours.  No results for input(s): LDL in the last 74110 hours.  No results for input(s): NA, POTASSIUM, CHLORIDE, CO2, GLC, BUN, CR, GFRESTIMATED, ANA in the last 57602 hours.    Invalid input(s): GRFESTBLACK  No results for input(s): CR in the last 63920 hours.  No results for input(s): A1C in the last 10473 hours.       No results for input(s): WBC, HGB, HCT, MCV, PLT in the last 20190 hours.  No results for input(s): HGB in the last 53518 hours. No results for input(s): TROPONINI in the last 76864 hours.  No results for input(s): BNP, NTBNPI, NTBNP in the last 47644 hours.  No results for input(s): TSH in the last 22893 hours.  No results for input(s):  INR in the last 56056 hours.     Renato Nolasco MD

## 2023-03-22 NOTE — PATIENT INSTRUCTIONS
An echocardiogram will help exclude an old heart attack.  No new medicines  If echo is normal you can have the knee surgery without additional  cardiac evaluation.

## 2023-03-28 ENCOUNTER — HOSPITAL ENCOUNTER (OUTPATIENT)
Dept: CARDIOLOGY | Facility: CLINIC | Age: 73
Discharge: HOME | End: 2023-03-28
Attending: INTERNAL MEDICINE | Admitting: INTERNAL MEDICINE
Payer: COMMERCIAL

## 2023-03-28 DIAGNOSIS — R94.31 ABNORMAL ELECTROCARDIOGRAM: ICD-10-CM

## 2023-03-28 PROCEDURE — 93306 TTE W/DOPPLER COMPLETE: CPT | Mod: 26 | Performed by: INTERNAL MEDICINE

## 2023-03-28 PROCEDURE — 93306 TTE W/DOPPLER COMPLETE: CPT

## 2023-03-29 ENCOUNTER — TELEPHONE (OUTPATIENT)
Dept: CARDIOLOGY | Facility: CLINIC | Age: 73
End: 2023-03-29
Payer: COMMERCIAL

## 2023-03-29 NOTE — TELEPHONE ENCOUNTER
Dr. Nolasco, please review echo results. Patient is awaiting okay to go ahead with upcoming surgery pending these results. -[facility]    Phone Message    May a detailed message be left on voicemail: yes     Reason for Call: Other: Please call pt with echo results. Thank you     Action Taken: Message routed to:  Other: Cardiology    Travel Screening: Not Applicable     Thank you!  [facility]

## 2023-03-31 ENCOUNTER — TELEPHONE (OUTPATIENT)
Dept: CARDIOLOGY | Facility: CLINIC | Age: 73
End: 2023-03-31
Payer: COMMERCIAL

## 2023-03-31 NOTE — TELEPHONE ENCOUNTER
Health Call Center    Phone Message    May a detailed message be left on voicemail: yes     Reason for Call: Other: Ranjith Hayes MD at [facility] needs to know if pt's Echo was good.  Please contact [place] Morales is pts PCP so please let her know the results call her at [phone].  Please donte pt to let her know that the results have been sent.  She can't have surgery w/out the report    Action Taken: Message routed to:  [facility] ([facility] cardio    Travel Screening: Not Applicable     Thank you!  [facility]

## 2023-04-03 NOTE — TELEPHONE ENCOUNTER
Phone call to PCP clinic - informed Cindy of echo results and Dr. Nolasco's response - Cindy verbalized understanding and stated she had requested results on Friday and has rec'd them - no further questions offered at this time.  mg

## 2023-04-03 NOTE — TELEPHONE ENCOUNTER
Copied from echo results note:  Echo looks normal. Trace AI (insignificant) and  normal LV size and function.   Good news.   Renato Nolasco MD on 4/3/2023 at 12:47 PM     Per Dr. Nolasco's 3-22-23 visit note:  Plan:   1.  We will get an echocardiogram to look at LV structure and function.  If this looks normal it effectively rules out previous anteroseptal MI and I do not think any myocardial perfusion imaging or noninvasive angiography is necessary.  She could proceed with her planned knee surgery without further cardiac evaluation.